# Patient Record
Sex: FEMALE | Race: OTHER | HISPANIC OR LATINO
[De-identification: names, ages, dates, MRNs, and addresses within clinical notes are randomized per-mention and may not be internally consistent; named-entity substitution may affect disease eponyms.]

---

## 2021-10-15 PROBLEM — Z00.00 ENCOUNTER FOR PREVENTIVE HEALTH EXAMINATION: Status: ACTIVE | Noted: 2021-10-15

## 2021-10-22 ENCOUNTER — APPOINTMENT (OUTPATIENT)
Dept: PAIN MANAGEMENT | Facility: CLINIC | Age: 46
End: 2021-10-22
Payer: COMMERCIAL

## 2021-10-22 ENCOUNTER — NON-APPOINTMENT (OUTPATIENT)
Age: 46
End: 2021-10-22

## 2021-10-22 VITALS
TEMPERATURE: 98 F | SYSTOLIC BLOOD PRESSURE: 120 MMHG | BODY MASS INDEX: 28.26 KG/M2 | DIASTOLIC BLOOD PRESSURE: 73 MMHG | WEIGHT: 131 LBS | HEIGHT: 57 IN

## 2021-10-22 DIAGNOSIS — M17.10 UNILATERAL PRIMARY OSTEOARTHRITIS, UNSPECIFIED KNEE: ICD-10-CM

## 2021-10-22 DIAGNOSIS — M79.18 MYALGIA, OTHER SITE: ICD-10-CM

## 2021-10-22 DIAGNOSIS — G89.4 CHRONIC PAIN SYNDROME: ICD-10-CM

## 2021-10-22 PROCEDURE — 99072 ADDL SUPL MATRL&STAF TM PHE: CPT

## 2021-10-22 PROCEDURE — 99204 OFFICE O/P NEW MOD 45 MIN: CPT

## 2021-10-22 RX ORDER — CICLOPIROX OLAMINE 7.7 MG/ML
0.77 SUSPENSION TOPICAL
Refills: 0 | Status: ACTIVE | COMMUNITY

## 2021-10-22 RX ORDER — LEFLUNOMIDE 10 MG/1
10 TABLET, FILM COATED ORAL
Refills: 0 | Status: ACTIVE | COMMUNITY

## 2021-10-22 RX ORDER — ETANERCEPT 25 MG
25 KIT SUBCUTANEOUS
Refills: 0 | Status: ACTIVE | COMMUNITY

## 2021-10-22 RX ORDER — NAPROXEN 500 MG/1
500 TABLET ORAL
Refills: 0 | Status: ACTIVE | COMMUNITY

## 2021-10-22 RX ORDER — TRAZODONE HYDROCHLORIDE 50 MG/1
50 TABLET ORAL
Refills: 0 | Status: ACTIVE | COMMUNITY

## 2021-10-22 RX ORDER — CANAGLIFLOZIN 300 MG/1
300 TABLET, FILM COATED ORAL
Refills: 0 | Status: ACTIVE | COMMUNITY

## 2021-10-22 RX ORDER — AMMONIUM LACTATE 12 %
LOTION (GRAM) TOPICAL
Refills: 0 | Status: ACTIVE | COMMUNITY

## 2021-10-22 RX ORDER — GLIPIZIDE 10 MG/1
10 TABLET, EXTENDED RELEASE ORAL
Refills: 0 | Status: ACTIVE | COMMUNITY

## 2021-10-22 RX ORDER — INSULIN GLARGINE 100 [IU]/ML
100 INJECTION, SOLUTION SUBCUTANEOUS
Refills: 0 | Status: ACTIVE | COMMUNITY

## 2021-10-22 RX ORDER — OMEPRAZOLE 40 MG/1
40 CAPSULE, DELAYED RELEASE ORAL
Refills: 0 | Status: ACTIVE | COMMUNITY

## 2021-10-22 RX ORDER — SITAGLIPTIN 100 MG/1
100 TABLET, FILM COATED ORAL
Refills: 0 | Status: ACTIVE | COMMUNITY

## 2021-10-22 RX ORDER — GABAPENTIN 300 MG
300 TABLET ORAL
Refills: 0 | Status: ACTIVE | COMMUNITY

## 2021-10-22 RX ORDER — INSULIN LISPRO 100 [IU]/ML
100 INJECTION, SOLUTION SUBCUTANEOUS
Refills: 0 | Status: ACTIVE | COMMUNITY

## 2021-10-22 RX ORDER — METFORMIN HYDROCHLORIDE 625 MG/1
TABLET ORAL
Refills: 0 | Status: ACTIVE | COMMUNITY

## 2021-10-22 RX ORDER — SUMATRIPTAN 25 MG/1
25 TABLET, FILM COATED ORAL
Refills: 0 | Status: ACTIVE | COMMUNITY

## 2021-10-22 RX ORDER — ALBUTEROL SULFATE 90 UG/1
108 (90 BASE) AEROSOL, METERED RESPIRATORY (INHALATION)
Refills: 0 | Status: ACTIVE | COMMUNITY

## 2021-10-22 RX ORDER — NAPHAZOLINE HCL/PHENIRAMINE .0268-.315
0.1-0.3 DROPS OPHTHALMIC (EYE)
Refills: 0 | Status: ACTIVE | COMMUNITY

## 2021-10-22 RX ORDER — LISINOPRIL AND HYDROCHLOROTHIAZIDE TABLETS 10; 12.5 MG/1; MG/1
TABLET ORAL
Refills: 0 | Status: ACTIVE | COMMUNITY

## 2021-10-22 NOTE — ASSESSMENT
[FreeTextEntry1] : >> Imaging and Other Studies\par \par Neck and arm pain likely secondary to cervical radiculopathy and discogenic pain vs spondylosis refractory to conservative treatments including 6 consecutive weeks of PT/home exercises, with significant pain and limitation in ROM will obtain MRI CS to evaluate for pathology\par \par may consider PT vs intervention pending eval\par \par XR BL knee\par \par >> Therapy and Other Modalities\par \par consider restarting PT\par \par >> Medications\par  \par continue gabapentin\par \par >> Interventions\par \par na\par \par >> Consults\par \par continue care with pmd regarding htn and dm care - encouraged patient to improve her overall health and stricter glycemic control\par \par >> Discussion of Risks/Benefits/Alternatives\par \par 	>Regarding any scheduled procedures:\par \par I have discussed in detail with the patient that any interventional pain procedure is associated with potential risks.  The procedure may include an injection of steroids and potentially other medications (local anesthetic and normal saline) into the epidural space or surrounding tissue of the spine.  There are significant risks of this procedure which include and are not limited to infection, bleeding, worsening pain, dural puncture leading to postdural puncture headache, nerve damage, spinal cord injury, paralysis, stroke, and death.  \par \par There is a chance that the procedure does not improve their pain.  \par \par There are risks associated with the steroid being absorbed into the body systemically.  These include dysphoria, difficulty sleeping, mood swings and personality changes.  Premenopausal women may notice an irregularity in her menstrual cycle for 2-3 months following the injection.  Steroids can specifically affect patients with hypertension, diabetes, and peptic ulcers.  The procedure may cause a temporary increase in blood pressure and blood pressure, and may adversely affect a peptic ulcer.  Other, more rare complications, include avascular necrosis of joints, glaucoma and worsening of osteoporosis. \par \par I have discussed the risks of the procedure at length with the patient, and the potential benefits of pain relief.  I have offered alternatives to the procedure.  All questions were answered.  \par \par The patient expressed understanding and wishes to proceed with the procedure.\par \par 	>Regarding COVID19 Pandemic: \par \par Any planned interventional pain procedure are scheduled because further delay may cause harm or negative outcome to patient.  The goal in performing this procedure is to avoid deterioration of function, emergency room visits (which increases exposure) and reliance on opioids.  \par \par r/b/a discussed with patient, lack of evidence to conclusively determine whether pain management procedures have any positive or negative impact on the possibility of monty the virus and/or development of any sequelae. \par \par Patient counselled regarding timing steroid based intervention 2 weeks before or after COVID-19 vaccine administration to avoid any interaction or affect on efficacy of vaccination\par \par Patient demonstrates understanding\par \par Informed patient that risks associated with the COVID-19 infection.  Informed patient steps taken to limit the risks.  We are implementing safety precautions and following protocols consistent with the CDC and state recommendations. All patients and staff will be checked for fever or signs of illness upon entry to the facility. We will limit our steroid dose to the lowest effective therapeutic dose or in some cases steroids will not be injected at all. \par \par Patient agrees to proceed\par \par >> Conclusion\par \par The above diagnosis and treatment plan is medically reasonable and necessary based on the patient encounter \par There were no barriers to communication.\par Informed patient that I would be available for any additional questions.\par Patient was instructed to call with any worsening symptoms including severe pain, new numbness/weakness, or changes in the bowel/bladder function. \par Discussed role of nsaids in pain management and all relevant risks, if patient is continuing to require after 4 weeks the patient should f/u for alternative treatment. \par Instructed patient to maintain pain diary to monitor pain level, mobility, and function.\par \par

## 2021-10-22 NOTE — REASON FOR VISIT
[Initial Consultation] : an initial pain management consultation [FreeTextEntry2] : referred by Dr. Nadege Fernandez  for evaluation of back pain

## 2021-10-22 NOTE — CONSULT LETTER
[Dear  ___] : Dear  [unfilled], [Consult Letter:] : I had the pleasure of evaluating your patient, [unfilled]. [Please see my note below.] : Please see my note below. [Consult Closing:] : Thank you very much for allowing me to participate in the care of this patient.  If you have any questions, please do not hesitate to contact me. [Sincerely,] : Sincerely, [FreeTextEntry3] : \par Dayanna Saravia DO, MBA\par Director, Pain Management Center\par  of Anesthesiology\par [facility] [employer] [facility]\par \par \par

## 2021-10-22 NOTE — PHYSICAL EXAM
[SLR] : positive straight leg raise [de-identified] : Constitutional: Normal, well developed, no acute distress\par Eyes: Symmetric, External structures \par Oropharynx: Lips normal, symmetric, no external lesions appreciated\par Respiratory: Non-labored breathing, no audible wheezes\par Cardiac: Pulse palpated, no tachycardia\par Vascular: No cyanosis appreciated, no edema in bilateral lower extremities\par GI: Nondistended, no jaundice appreciated\par Neurovascular: CN2-12 grossly intact, Alert and oriented\par MSK: Normal muscle bulk, 5/5 Motor strength B/L in LE\par \par

## 2021-11-07 ENCOUNTER — RESULT REVIEW (OUTPATIENT)
Age: 46
End: 2021-11-07

## 2023-03-09 ENCOUNTER — APPOINTMENT (OUTPATIENT)
Dept: PEDIATRIC ORTHOPEDIC SURGERY | Facility: CLINIC | Age: 48
End: 2023-03-09
Payer: COMMERCIAL

## 2023-03-09 DIAGNOSIS — M54.12 RADICULOPATHY, CERVICAL REGION: ICD-10-CM

## 2023-03-09 DIAGNOSIS — M79.18 MYALGIA, OTHER SITE: ICD-10-CM

## 2023-03-09 PROCEDURE — 99203 OFFICE O/P NEW LOW 30 MIN: CPT | Mod: 25

## 2023-03-09 PROCEDURE — 73521 X-RAY EXAM HIPS BI 2 VIEWS: CPT

## 2023-03-09 PROCEDURE — 72100 X-RAY EXAM L-S SPINE 2/3 VWS: CPT

## 2023-03-09 PROCEDURE — 72040 X-RAY EXAM NECK SPINE 2-3 VW: CPT

## 2023-03-09 PROCEDURE — 20552 NJX 1/MLT TRIGGER POINT 1/2: CPT

## 2023-03-09 RX ORDER — CYCLOBENZAPRINE HYDROCHLORIDE 5 MG/1
5 TABLET, FILM COATED ORAL TWICE DAILY
Qty: 30 | Refills: 1 | Status: ACTIVE | COMMUNITY
Start: 2023-03-09 | End: 1900-01-01

## 2023-03-09 RX ORDER — DICLOFENAC SODIUM 75 MG/1
75 TABLET, DELAYED RELEASE ORAL TWICE DAILY
Qty: 60 | Refills: 1 | Status: ACTIVE | COMMUNITY
Start: 2023-03-09 | End: 1900-01-01

## 2023-03-09 NOTE — CONSULT LETTER
[Dear  ___] : Dear  [unfilled], [Consult Letter:] : I had the pleasure of evaluating your patient, [unfilled]. [Please see my note below.] : Please see my note below. [Consult Closing:] : Thank you very much for allowing me to participate in the care of this patient.  If you have any questions, please do not hesitate to contact me. [Sincerely,] : Sincerely, [FreeTextEntry3] : Dr Kt Mcclelland JR.\par

## 2023-03-09 NOTE — HISTORY OF PRESENT ILLNESS
[de-identified] : This 47-year-old lady is seen today for evaluation of multiple complaints.  She has had a very long history of neck and low back pain radiating into her shoulders as well as pain in both hips and knees.  This once again over many years.  Her current more active complaints at this time her neck low back pain and her hips.  She has been treated with physical therapy a couple of years ago and has been on multiple medications in the past.  She was on Enbrel a few years back.  Her neck and back complaints do not radiate to any significant extent into either upper or lower extremities.  She does not complain of numbness paresthesias.  No history of rash.  She does have difficulty traversing stairs getting in and out of chairs and long distance walking.  Her past history also includes hypertension diabetes.  She is allergic to metformin

## 2023-03-09 NOTE — PROCEDURE
[FreeTextEntry1] : Under sterile technique with an alcohol prep to trigger point in the cervical trapezial segment was injected with a 22-gauge needle with 40 mg of methylprednisolone and 2 cc of 1% lidocaine with a Band-Aid dressing applied at the conclusion this was tolerated without incident.\par \par Under sterile technique with an alcohol prep to trigger point in the lumbar musculature was injected with 40 mg of methylprednisolone and 4 cc of 1% lidocaine with a Band-Aid dressing applied at the conclusion this was tolerated without incident

## 2023-03-09 NOTE — PHYSICAL EXAM
[de-identified] : Her exam today reveals she is overweight.  She does have a straight spine level pelvis and no leg length discrepancy.  She does have a mild antalgic gait on both sides.  With regards to the cervical spine she does have restricted motion especially in flexion extension of a moderate nature.  Rotation is mildly affected.  Spasm and tenderness is noted on both sides of the midline with a trigger point in the right trapezial segment..  Both upper extremities move well at all levels.\par \par The lumbar spine reveals reasonable motion in all planes though it is with discomfort.  She has spasm and tenderness is noted on both sides and midline there is a trigger point on the left side of the lumbar musculature..  The posterior pelvic wall is nontender.  With regards to the lower extremities she does have restricted motion to her both hips symmetrically loss of rotation as well as abduction.  Discomfort over the anterior hip capsule on both sides is noted.  Both knees have supple motion with no significant effusions on today's visit and no evidence of instability on stress.  Straight leg raising is uncomfortable though negative neurologically there are no focal motor deficits noted in either upper or lower extremities.\par \par X-rays ordered and taken today of the cervical and lumbar spine as well as the hips reveal cervical spondylosis mild multilevel degenerative disc space narrowing and facet arthritis.  The lumbar spine reveals what appears to be fusion of the posterior spinous processes at multiple levels.  The space narrowing spondylosis noted as well.  Evaluation of the hips reveals moderate arthritic changes present with spur formation and joint space narrowing no lesion

## 2023-03-09 NOTE — ASSESSMENT
[FreeTextEntry1] : Impression: Cervical and lumbar radiculitis.  Symptomatic arthritis both knees.\par \par This patient has been referred for physical therapy she has been placed on Flexeril and Mobic with GI precautions.  The trigger points in the cervical and lumbar musculature were injected with methylprednisolone and lidocaine.  I have given her prescription for a rheumatologic profile.  She will return in 1 month for follow-up.  Further rheumatologic consultation may become necessary along with possible imaging studies of the spine.

## 2023-04-14 ENCOUNTER — APPOINTMENT (OUTPATIENT)
Dept: PEDIATRIC ORTHOPEDIC SURGERY | Facility: CLINIC | Age: 48
End: 2023-04-14
Payer: COMMERCIAL

## 2023-04-14 VITALS
WEIGHT: 130 LBS | HEIGHT: 57 IN | BODY MASS INDEX: 28.05 KG/M2 | SYSTOLIC BLOOD PRESSURE: 122 MMHG | TEMPERATURE: 96.2 F | DIASTOLIC BLOOD PRESSURE: 76 MMHG

## 2023-04-14 DIAGNOSIS — M19.012 PRIMARY OSTEOARTHRITIS, LEFT SHOULDER: ICD-10-CM

## 2023-04-14 DIAGNOSIS — M54.16 RADICULOPATHY, LUMBAR REGION: ICD-10-CM

## 2023-04-14 DIAGNOSIS — M16.0 BILATERAL PRIMARY OSTEOARTHRITIS OF HIP: ICD-10-CM

## 2023-04-14 DIAGNOSIS — M54.12 RADICULOPATHY, CERVICAL REGION: ICD-10-CM

## 2023-04-14 PROCEDURE — 73030 X-RAY EXAM OF SHOULDER: CPT

## 2023-04-14 PROCEDURE — 20610 DRAIN/INJ JOINT/BURSA W/O US: CPT

## 2023-04-14 PROCEDURE — 99214 OFFICE O/P EST MOD 30 MIN: CPT | Mod: 25

## 2023-04-14 NOTE — HISTORY OF PRESENT ILLNESS
[de-identified] : This 47-year-old returns for reevaluation of multiple problems.  She has been to physical therapy this did not provide her much in the way of relief.  Not a lot of relief from the Flexeril and Mobic as well.  Continues with significant neck pain that radiates into the left upper extremity as well as back and hip pain or longstanding.  She is having increasing difficulty with motion of the left shoulder.  Stiffness of the neck continues along with increasing difficulty ambulating and getting in and out of chairs and traversing stairs.

## 2023-04-14 NOTE — ASSESSMENT
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  She will continue with medications as prescribed.  She did have [facility] test [facility] however we have not received these results as yet.  I have asked this patient and her daughter to follow through with returning to see her rheumatologist.  An MRI of the cervical spine has been ordered as the previous MRI of the cervical spine was performed in 2021 with significant changes present and multilevel with stenosis more so on the left side.  Pain management is likely to be ordered pending results of the studies.

## 2023-04-14 NOTE — PHYSICAL EXAM
[de-identified] : Exam today reveals painful gait on both sides.  With regards to the neck no significant change from prior visit with restricted motion in flexion extension of a moderate nature rotation is affected as well.  Spasm is noted on both sides of the midline more so to the left on today's visit no obvious trigger points present.  The left upper extremity the left shoulder in particular she does have moderate restriction of full forward flexion abduction in the scapular plane as well as significant restriction of rotation both in/out.  Elbow hand and wrist move well.  Passive motion of the left shoulder reveals no significant increase in her motion present.  No instability or crepitus.  She is moving the right shoulder much better.  With regards to the lumbar spine no significant changes are noted here as well her motion is reasonable however it is with pain and spasm at the limits.  No obvious trigger points on today's visit.  Both hips again have significant loss of rotation as well as abduction with pain over the anterior hip capsules.\par \par X-rays ordered and taken today of the left shoulder reveal changes that what appears to be consistent with inflammatory arthritis.  Mild spur formation

## 2023-04-14 NOTE — PROCEDURE
[FreeTextEntry1] : Under sterile technique with a Betadine prep the left shoulder was injected into the glenohumeral joint from an anterior portal using a 22-gauge needle with 40 mg of methylprednisolone and 3 cc of 1% lidocaine with a Band-Aid dressing applied at the conclusion following this injection that was some improvement with regards to her forward flexion and less discomfort.

## 2023-05-08 RX ORDER — CYCLOBENZAPRINE HYDROCHLORIDE 5 MG/1
5 TABLET, FILM COATED ORAL TWICE DAILY
Qty: 30 | Refills: 1 | Status: ACTIVE | COMMUNITY
Start: 2023-05-08 | End: 1900-01-01

## 2023-05-08 RX ORDER — DICLOFENAC SODIUM 75 MG/1
75 TABLET, DELAYED RELEASE ORAL TWICE DAILY
Qty: 60 | Refills: 1 | Status: ACTIVE | COMMUNITY
Start: 2023-05-08 | End: 1900-01-01

## 2025-04-25 NOTE — HISTORY OF PRESENT ILLNESS
[Today's Date] : [unfilled] [FreeTextEntry1] : EKG shows normal sinus rhythm at rate of 135 bpm with right axis, no chamber enlargement and normal intervals. [FreeTextEntry2] : Echocardiogram demonstrates patent s[place] with left-to-right shunting otherwise, structurally normal heart with normal biventricular size and function without pericardial effusion. [___ yrs] : [unfilled] year(s) ago [Constant] : constant [7] : an average pain level of 7/10 [6] : a minimum pain level of 6/10 [8] : a maximum pain level of 8/10 [Dull] : dull [Aching] : aching [Throbbing] : throbbing [Burning] : burning [Shooting] : shooting [Walking] : walking [Medications] : medications [FreeTextEntry1] : HPI\par \par Ms. TIFFANIE FERNANDEZ is a 46 year F with pmhx of on asa 81mg primary prophylaxis, DM, htn, migraines, rheumatoid arthritis on embral q2 weeks, presents with bilateral neck pain radiating to bilateral shoulder, lateral arm.  Complains of bilateral lower back pain. Pain is so bad that patient finds it difficult to perform adls and ambulate. denies any worsening numbness, weakness, bowel/bladder dysfunction. Also complains of bilateral knee pain R>>L worse with ambulation. \par \par Previous and current pain medications/doses/effects:\par \par gabapentin with improvement\par naproxen without improvement\par \par Previous Pain Treatments:\par \par PT without improvement\par \par Previous Pain Injections:\par \par bilateral knee injection with mild improvement\par \par Previous Diagnostic Studies/Images:\par \par na\par  [FreeTextEntry2] : 20 [FreeTextEntry7] : Referred by [facility] Dr. dimitri Fernandez.  Back pain , and leg pain right and left side. [FreeTextEntry4] : PT 3x a week